# Patient Record
Sex: FEMALE | Race: WHITE | NOT HISPANIC OR LATINO | Employment: FULL TIME | URBAN - METROPOLITAN AREA
[De-identification: names, ages, dates, MRNs, and addresses within clinical notes are randomized per-mention and may not be internally consistent; named-entity substitution may affect disease eponyms.]

---

## 2017-08-03 ENCOUNTER — APPOINTMENT (OUTPATIENT)
Dept: WOMENS IMAGING | Facility: HOSPITAL | Age: 42
End: 2017-08-03

## 2017-08-03 PROCEDURE — 77067 SCR MAMMO BI INCL CAD: CPT | Performed by: RADIOLOGY

## 2018-08-10 ENCOUNTER — APPOINTMENT (OUTPATIENT)
Dept: WOMENS IMAGING | Facility: HOSPITAL | Age: 43
End: 2018-08-10

## 2018-08-10 PROCEDURE — 77067 SCR MAMMO BI INCL CAD: CPT | Performed by: RADIOLOGY

## 2019-08-27 ENCOUNTER — APPOINTMENT (OUTPATIENT)
Dept: WOMENS IMAGING | Facility: HOSPITAL | Age: 44
End: 2019-08-27

## 2019-08-27 PROCEDURE — 77063 BREAST TOMOSYNTHESIS BI: CPT | Performed by: RADIOLOGY

## 2019-08-27 PROCEDURE — 77067 SCR MAMMO BI INCL CAD: CPT | Performed by: RADIOLOGY

## 2020-04-24 ENCOUNTER — TELEMEDICINE (OUTPATIENT)
Dept: GASTROENTEROLOGY | Facility: CLINIC | Age: 45
End: 2020-04-24

## 2020-04-24 VITALS — WEIGHT: 135 LBS

## 2020-04-24 DIAGNOSIS — R14.0 BLOATING: ICD-10-CM

## 2020-04-24 DIAGNOSIS — K21.9 GASTROESOPHAGEAL REFLUX DISEASE, ESOPHAGITIS PRESENCE NOT SPECIFIED: ICD-10-CM

## 2020-04-24 DIAGNOSIS — K58.0 IRRITABLE BOWEL SYNDROME WITH DIARRHEA: Primary | ICD-10-CM

## 2020-04-24 DIAGNOSIS — K22.0 ACHALASIA: ICD-10-CM

## 2020-04-24 PROCEDURE — 99213 OFFICE O/P EST LOW 20 MIN: CPT | Performed by: NURSE PRACTITIONER

## 2020-04-24 RX ORDER — OMEPRAZOLE AND SODIUM BICARBONATE 40; 1100 MG/1; MG/1
1 CAPSULE ORAL
Qty: 90 CAPSULE | Refills: 3 | Status: SHIPPED | OUTPATIENT
Start: 2020-04-24 | End: 2020-07-24 | Stop reason: SDUPTHER

## 2020-04-24 RX ORDER — SUCRALFATE 1 G/1
1 TABLET ORAL 4 TIMES DAILY PRN
Qty: 360 TABLET | Refills: 3 | Status: SHIPPED | OUTPATIENT
Start: 2020-04-24 | End: 2021-04-24

## 2020-04-24 RX ORDER — SUCRALFATE 1 G/1
1 TABLET ORAL 4 TIMES DAILY
COMMUNITY
End: 2020-04-24 | Stop reason: SDUPTHER

## 2020-04-24 RX ORDER — OMEPRAZOLE/SODIUM BICARBONATE 40; 1680 MG/1; MG/1
POWDER, FOR SUSPENSION ORAL
COMMUNITY
End: 2020-04-24

## 2020-04-24 NOTE — PROGRESS NOTES
Chief Complaint   Patient presents with   • Bloated     follow up   • Heartburn   • Irritable Bowel Syndrome       HPI  44-year-old female presents today for telehealth visit for follow-up.  She was a patient in her previous practice and has a history of esophagitis, achalasia, GERD, abdominal bloating, and IBS.    She has a history of esophagitis, GERD, and achalasia and intermittently takes Zegerid 40 mg / 1100 mg when symptoms flare with good relief of symptoms.  Additionally, she will use sucralfate tablets up to 4 times daily as needed for epigastric discomfort.  She request refills for these medications due to a flare of symptoms, which she feels is secondary to stress.  She reports that her last EGD was greater than 5 years ago.  She has undergone esophageal manometry and underwent Botox injection into the LES by Dr. Fields for achalasia, which did work well to manage symptoms.  Records are not available, but we will request for our review.  Occasionally she will experience difficulty with swallowing meats and breads, but has altered her diet to compensate and at this time does not feel that any intervention is warranted.    She has a history of abdominal bloating and IBS, and symptoms are worse when she is stressed.  She is up-to-date on her female exams.  She reports her bowel habits are regular and denies having any diarrhea, constipation, melena, or hematochezia at the present time.  She has experienced a significant amount of abdominal bloating over the past several weeks and will alter her diet accordingly to help manage symptoms when needed.  However, dietary measures alone have not been effective.  Alcohol seems to worsen symptoms. She denies any abdominal pain. She has taken Xifaxan in the past with good results, but has also had some problems with insurancecoverage. She does not take a daily probiotic. She has never had a colonoscopy.  She denies any family history of colon cancer.  She reports a  history of breast cancer in her mother and a history of renal cancer in her father.    You have chosen to receive care through a telehealth visit.  Do you consent to use a video/audio connection for your medical care today? Yes      Review of Systems   Constitutional: Negative for fatigue and fever.   Respiratory: Negative for cough and shortness of breath.    Gastrointestinal: Positive for abdominal distention. Negative for abdominal pain, constipation and diarrhea.       Problem List:  There is no problem list on file for this patient.      Medical History:    Past Medical History:   Diagnosis Date   • GERD (gastroesophageal reflux disease)    • Irritable bowel syndrome         Social History:    Social History     Socioeconomic History   • Marital status: Unknown     Spouse name: Not on file   • Number of children: Not on file   • Years of education: Not on file   • Highest education level: Not on file   Tobacco Use   • Smoking status: Never Smoker   • Smokeless tobacco: Never Used   Substance and Sexual Activity   • Alcohol use: Yes     Alcohol/week: 3.0 standard drinks     Types: 3 Glasses of wine per week   • Drug use: Never   • Sexual activity: Defer       Family History:   Family History   Problem Relation Age of Onset   • Colon polyps Mother    • Breast cancer Mother    • Colon polyps Father    • Kidney cancer Father    • Liver cancer Neg Hx        Surgical History:   Past Surgical History:   Procedure Laterality Date   • UPPER GASTROINTESTINAL ENDOSCOPY           Current Outpatient Medications:   •  omeprazole-sodium bicarbonate (ZEGERID)  MG pack packet, Take  by mouth Every Morning Before Breakfast., Disp: , Rfl:   •  sucralfate (CARAFATE) 1 g tablet, Take 1 g by mouth 4 (Four) Times a Day., Disp: , Rfl:     Allergies:  Patient has no known allergies.    The following portions of the patient's history were reviewed and updated as appropriate: allergies, current medications, past family history,  past medical history, past social history, past surgical history and problem list.    Physical Exam    Assessment/ Plan  Maria Del Rosario was seen today for bloated, heartburn and irritable bowel syndrome.    Diagnoses and all orders for this visit:    Irritable bowel syndrome with diarrhea    Bloating    Gastroesophageal reflux disease, esophagitis presence not specified    Achalasia         Return in about 1 year (around 4/24/2021) for follow up or sooner if symptoms worsen or fail to improve.        Discussion:  We will provide the patient with prescriptions for Zegerid and sucralfate for the management of GERD.  She has entered tremendous amount of stress at this time, and symptoms have flared.    For abdominal bloating and IBS, we will place the patient on a course of Xifaxan.  We have also recommended use of a daily probiotic as well as the low FODMAP diet to help the patient identify specific foods it could be contributing to her symptoms.  Patient recommended to have screening colonoscopy at age 45.  Recommend annual office follow-up for reassessment of symptoms and medication refills, or sooner should symptoms worsen or fail to improve.  Patient verbalized understanding of above and is in agreement with plan of care.  All questions answered and support provided.    This visit was completed as a telemedicine video visit due to COVID-19 pandemic.

## 2020-04-24 NOTE — PATIENT INSTRUCTIONS
1.  For intermittent GERD, you may take Zegerid 1 tablet daily.  Refills have been sent to your pharmacy.    2.  Additionally for GERD and epigastric discomfort, you may take sucralfate 1 tablet up to 4 times daily as needed.  We recommend dissolving sucralfate and 1 tablespoon of water to make a slurry, then swallow.  Refills have been sent to your pharmacy.    3.  Recommend daily probiotic.    4.  For abdominal bloating and IBS, we will place you on a course of Xifaxan.  You will take 1 tablet 3 times daily x14 days.  Please visit [facility] to fill out a patient form that will enable you to receive your medication for a $0 copay.    5.  For colon cancer screening purposes, we recommend screening colonoscopy at age 45.    6.  Recommend annual office follow-up for reassessment and medication refills, or sooner should symptoms worsen or fail to improve.

## 2020-07-24 DIAGNOSIS — K21.9 GASTROESOPHAGEAL REFLUX DISEASE, ESOPHAGITIS PRESENCE NOT SPECIFIED: Primary | ICD-10-CM

## 2020-07-24 RX ORDER — OMEPRAZOLE AND SODIUM BICARBONATE 40; 1100 MG/1; MG/1
1 CAPSULE ORAL
Qty: 90 CAPSULE | Refills: 3 | Status: SHIPPED | OUTPATIENT
Start: 2020-07-24 | End: 2021-07-19

## 2021-04-22 ENCOUNTER — APPOINTMENT (OUTPATIENT)
Dept: WOMENS IMAGING | Facility: HOSPITAL | Age: 46
End: 2021-04-22

## 2021-04-22 PROCEDURE — 77063 BREAST TOMOSYNTHESIS BI: CPT | Performed by: RADIOLOGY

## 2021-04-22 PROCEDURE — 77067 SCR MAMMO BI INCL CAD: CPT | Performed by: RADIOLOGY

## 2022-05-24 ENCOUNTER — APPOINTMENT (OUTPATIENT)
Dept: WOMENS IMAGING | Facility: HOSPITAL | Age: 47
End: 2022-05-24

## 2022-05-24 PROCEDURE — 77067 SCR MAMMO BI INCL CAD: CPT | Performed by: RADIOLOGY

## 2022-05-24 PROCEDURE — 77063 BREAST TOMOSYNTHESIS BI: CPT | Performed by: RADIOLOGY

## 2023-08-14 ENCOUNTER — APPOINTMENT (OUTPATIENT)
Dept: WOMENS IMAGING | Facility: HOSPITAL | Age: 48
End: 2023-08-14
Payer: COMMERCIAL

## 2023-08-14 PROCEDURE — G0279 TOMOSYNTHESIS, MAMMO: HCPCS | Performed by: RADIOLOGY

## 2023-08-14 PROCEDURE — 77065 DX MAMMO INCL CAD UNI: CPT | Performed by: RADIOLOGY

## 2023-08-14 PROCEDURE — 77061 BREAST TOMOSYNTHESIS UNI: CPT | Performed by: RADIOLOGY

## 2024-02-19 ENCOUNTER — APPOINTMENT (OUTPATIENT)
Dept: WOMENS IMAGING | Facility: HOSPITAL | Age: 49
End: 2024-02-19
Payer: COMMERCIAL

## 2024-02-19 PROCEDURE — G0279 TOMOSYNTHESIS, MAMMO: HCPCS | Performed by: RADIOLOGY

## 2024-02-19 PROCEDURE — 77065 DX MAMMO INCL CAD UNI: CPT | Performed by: RADIOLOGY

## 2024-02-19 PROCEDURE — 77061 BREAST TOMOSYNTHESIS UNI: CPT | Performed by: RADIOLOGY

## 2024-05-21 DIAGNOSIS — R10.11 RIGHT UPPER QUADRANT ABDOMINAL PAIN: ICD-10-CM

## 2024-05-21 DIAGNOSIS — R11.2 NAUSEA AND VOMITING, UNSPECIFIED VOMITING TYPE: Primary | ICD-10-CM

## 2024-06-06 VITALS — WEIGHT: 130 LBS

## 2024-06-07 ENCOUNTER — HOSPITAL ENCOUNTER (OUTPATIENT)
Dept: NUCLEAR MEDICINE | Facility: HOSPITAL | Age: 49
Discharge: HOME | End: 2024-06-07
Payer: COMMERCIAL

## 2024-06-07 DIAGNOSIS — R11.2 NAUSEA AND VOMITING, UNSPECIFIED VOMITING TYPE: ICD-10-CM

## 2024-06-07 DIAGNOSIS — R10.11 RIGHT UPPER QUADRANT ABDOMINAL PAIN: ICD-10-CM

## 2024-06-07 PROCEDURE — 78227 HEPATOBIL SYST IMAGE W/DRUG: CPT

## 2024-06-07 PROCEDURE — 25010000002 SINCALIDE PER 5 MCG: Performed by: NURSE PRACTITIONER

## 2024-06-07 PROCEDURE — 0 TECHNETIUM TC 99M MEBROFENIN KIT: Performed by: NURSE PRACTITIONER

## 2024-06-07 PROCEDURE — A9537 TC99M MEBROFENIN: HCPCS | Performed by: NURSE PRACTITIONER

## 2024-06-07 RX ORDER — KIT FOR THE PREPARATION OF TECHNETIUM TC 99M MEBROFENIN 45 MG/10ML
1 INJECTION, POWDER, LYOPHILIZED, FOR SOLUTION INTRAVENOUS
Status: COMPLETED | OUTPATIENT
Start: 2024-06-07 | End: 2024-06-07

## 2024-06-07 RX ADMIN — MEBROFENIN 1 DOSE: 45 INJECTION, POWDER, LYOPHILIZED, FOR SOLUTION INTRAVENOUS at 08:30

## 2024-06-07 RX ADMIN — SINCALIDE 1.2 MCG: 5 INJECTION, POWDER, LYOPHILIZED, FOR SOLUTION INTRAVENOUS at 09:49

## 2024-08-07 ENCOUNTER — TRANSCRIBE ORDERS (OUTPATIENT)
Dept: ADMINISTRATIVE | Facility: HOSPITAL | Age: 49
End: 2024-08-07
Payer: COMMERCIAL

## 2024-08-07 DIAGNOSIS — Z80.3 FAMILY HISTORY OF BREAST CANCER: Primary | ICD-10-CM

## 2024-08-09 ENCOUNTER — OFFICE VISIT (OUTPATIENT)
Dept: GASTROENTEROLOGY | Facility: CLINIC | Age: 49
End: 2024-08-09
Payer: COMMERCIAL

## 2024-08-09 ENCOUNTER — PREP FOR SURGERY (OUTPATIENT)
Dept: SURGERY | Facility: SURGERY CENTER | Age: 49
End: 2024-08-09
Payer: COMMERCIAL

## 2024-08-09 VITALS
HEART RATE: 61 BPM | HEIGHT: 68 IN | BODY MASS INDEX: 19.85 KG/M2 | DIASTOLIC BLOOD PRESSURE: 58 MMHG | OXYGEN SATURATION: 99 % | TEMPERATURE: 98.2 F | WEIGHT: 131 LBS | SYSTOLIC BLOOD PRESSURE: 100 MMHG

## 2024-08-09 DIAGNOSIS — R10.10 UPPER ABDOMINAL PAIN: ICD-10-CM

## 2024-08-09 DIAGNOSIS — R11.2 NAUSEA AND VOMITING, UNSPECIFIED VOMITING TYPE: Primary | ICD-10-CM

## 2024-08-09 DIAGNOSIS — K22.0 ACHALASIA: ICD-10-CM

## 2024-08-09 DIAGNOSIS — K63.8219 SMALL INTESTINAL BACTERIAL OVERGROWTH (SIBO): ICD-10-CM

## 2024-08-09 DIAGNOSIS — R10.10 UPPER ABDOMINAL PAIN: Chronic | ICD-10-CM

## 2024-08-09 DIAGNOSIS — K21.9 GASTROESOPHAGEAL REFLUX DISEASE, UNSPECIFIED WHETHER ESOPHAGITIS PRESENT: ICD-10-CM

## 2024-08-09 DIAGNOSIS — K22.0 ACHALASIA: Chronic | ICD-10-CM

## 2024-08-09 DIAGNOSIS — Z12.11 COLON CANCER SCREENING: ICD-10-CM

## 2024-08-09 DIAGNOSIS — K21.9 GASTROESOPHAGEAL REFLUX DISEASE, UNSPECIFIED WHETHER ESOPHAGITIS PRESENT: Chronic | ICD-10-CM

## 2024-08-09 DIAGNOSIS — R11.2 NAUSEA AND VOMITING, UNSPECIFIED VOMITING TYPE: Primary | Chronic | ICD-10-CM

## 2024-08-09 PROCEDURE — 99204 OFFICE O/P NEW MOD 45 MIN: CPT | Performed by: NURSE PRACTITIONER

## 2024-08-09 RX ORDER — SODIUM CHLORIDE, SODIUM LACTATE, POTASSIUM CHLORIDE, CALCIUM CHLORIDE 600; 310; 30; 20 MG/100ML; MG/100ML; MG/100ML; MG/100ML
30 INJECTION, SOLUTION INTRAVENOUS CONTINUOUS PRN
OUTPATIENT
Start: 2024-08-09

## 2024-08-09 RX ORDER — SODIUM CHLORIDE 0.9 % (FLUSH) 0.9 %
10 SYRINGE (ML) INJECTION AS NEEDED
OUTPATIENT
Start: 2024-08-09

## 2024-08-09 RX ORDER — PROGESTERONE 200 MG/1
200 CAPSULE ORAL DAILY
COMMUNITY

## 2024-08-09 RX ORDER — SODIUM CHLORIDE 0.9 % (FLUSH) 0.9 %
3 SYRINGE (ML) INJECTION EVERY 12 HOURS SCHEDULED
OUTPATIENT
Start: 2024-08-09

## 2024-08-09 NOTE — PATIENT INSTRUCTIONS
1.  For further evaluate of chronic nausea, vomiting, history of mild achalasia, and upper abdominal pain we will schedule EGD.    2.  For colon cancer screening we will schedule colonoscopy.    3.  We will also schedule a UGI with SBFT.     4.  You may use Zofran as prescribed as needed for nausea/vomiting.     5.  You may use OTC Pantoprazole 20 mg once daily as needed for upper abdominal pain if you feel it is GERD related.    6.  We will send in a prescription for Levsin. You may place 1-2 tabs under the tongue every 6 hours as needed for upper abdominal pain.    7.  Recommend next office follow-up visit 2-4 weeks after scopes.

## 2024-08-09 NOTE — PROGRESS NOTES
Chief Complaint   Patient presents with    Nausea    Vomiting    Abdominal Pain    Heartburn         History of Present Illness  48-year-old male presents the office today as a new patient (previous patient) with reports of abdominal bloating, nausea, and vomiting.  She still has her gallbladder.  HIDA scan on 6/7/2024 revealed normal gallbladder EF of 79%.  Last EGD performed on 10/19/2019 with Dr. Fields included Botox injection into the LES for achalasia.  Mild achalasia was noted on video swallowing study on 10/10/2012.    She reports that the Botox injection into the LES in 2019 provided relief. She could swallow much better at that time. If she does not chew her food thoroughly she will have dysphagia. She has pill dysphagia. Breads can be difficult. She underwent esophageal manometry testing September 2012 which revealed contraction wave formation.     She reports random RUQ pain about 2-3 x per week.  She can have pain with or without eating. Pain can move over to the LUQ and she describes this as a squeezing type of pain. She had eggs this morning for breakfast and then had pressure/squeezing in her upper abdomen. This occurs about 1-1.5 hours after eating. She always has a dull ache in her RUQ. She was given some Reglan in the [place] which did seem to help her symptoms somewhat. She has reported improvement with PPI use in the past, but not consistently.     She had intermittent n/v for the past 2 years that would occur about once every 2-3 months. It almost felt like a stomach virus. She would not have diarrhea. Symptoms could occur in the middle of the night. She reports having a RUQ u/s done in the [place] on 4/30/2024 which was normal per her report. She had an abnormal EKG and underwent an echocardiogram, but states that images were difficult to see due to breast implants and heart MRI was ordered.  All of this recent testing was performed in the [place], as she was living abroad at the  "time.  She was recommended to schedule an appt with cardiology for follow up.     She has never had a colonoscopy. Bms are regular. She denies any melena or rectal bleeding. Her weight is stable.     She has cut out dairy and states her bloating has significantly improved. She has had good results with Xifaxan in the past.       Result Review :       ENDOSCOPY, INT (10/19/2019)   NM HIDA SCAN WITH PHARMACOLOGICAL INTERVENTION (06/07/2024 10:36)   SCANNED - IMAGING (10/10/2012)     Vital Signs:   /58   Pulse 61   Temp 98.2 °F (36.8 °C)   Ht 172.7 cm (68\")   Wt 59.4 kg (131 lb)   SpO2 99%   BMI 19.92 kg/m²     Body mass index is 19.92 kg/m².     Physical Exam  Vitals reviewed.   Constitutional:       Appearance: Normal appearance.   HENT:      Head: Normocephalic.      Nose: Nose normal.      Mouth/Throat:      Mouth: Mucous membranes are moist.   Eyes:      General: No scleral icterus.     Extraocular Movements: Extraocular movements intact.   Cardiovascular:      Rate and Rhythm: Normal rate and regular rhythm.      Pulses: Normal pulses.      Heart sounds: Normal heart sounds.   Pulmonary:      Effort: Pulmonary effort is normal. No respiratory distress.      Breath sounds: Normal breath sounds.   Abdominal:      General: Abdomen is flat. Bowel sounds are normal. There is no distension.      Palpations: Abdomen is soft. There is no mass.      Tenderness: There is abdominal tenderness. There is no guarding.      Comments: Discomfort on palpation of epigastric area   Musculoskeletal:         General: Normal range of motion.      Cervical back: Normal range of motion and neck supple.   Skin:     General: Skin is warm and dry.   Neurological:      General: No focal deficit present.      Mental Status: She is alert and oriented to person, place, and time.   Psychiatric:         Mood and Affect: Mood normal.         Behavior: Behavior normal.         Thought Content: Thought content normal.         Judgment: " Judgment normal.       Assessment and Plan    Diagnoses and all orders for this visit:    1. Nausea and vomiting, unspecified vomiting type (Primary)  -     FL Upper GI Double Contrast & SBFT; Future    2. Upper abdominal pain  -     FL Upper GI Double Contrast & SBFT; Future    3. Achalasia  -     FL Upper GI Double Contrast & SBFT; Future    4. Gastroesophageal reflux disease, unspecified whether esophagitis present  -     FL Upper GI Double Contrast & SBFT; Future    5. Small intestinal bacterial overgrowth (SIBO)  Comments:  History of  Orders:  -     FL Upper GI Double Contrast & SBFT; Future    6. Colon cancer screening    Other orders  -     hyoscyamine (LEVSIN) 0.125 MG SL tablet; May place 1-2 tabs under the tongue every 6 hrs as needed for esophageal spasms  Dispense: 60 tablet; Refill: 2           Patient Instructions   1.  For further evaluate of chronic nausea, vomiting, history of mild achalasia, and upper abdominal pain we will schedule EGD.    2.  For colon cancer screening we will schedule colonoscopy.    3.  We will also schedule a UGI with SBFT.     4.  You may use Zofran as prescribed as needed for nausea/vomiting.     5.  You may use OTC Pantoprazole 20 mg once daily as needed for upper abdominal pain if you feel it is GERD related.    6.  We will send in a prescription for Levsin. You may place 1-2 tabs under the tongue every 6 hours as needed for upper abdominal pain.    7.  Recommend next office follow-up visit 2-4 weeks after scopes.       Discussion:    We will proceed with EGD and colonoscopy for further evaluation of intermittent nausea and vomiting, history of mild achalasia, upper abdominal pain.  For colon cancer screening we will schedule colonoscopy.    For further evaluation of upper abdominal pain and to ensure normal transit is occurring in her small intestine, we will proceed with upper GI with small bowel follow-through.  We will contact patient with results once available.       Prescription for Levsin has been sent to the patient's pharmacy to help with management of upper abdominal pain and to help relax the esophagus as she does have a history of achalasia.    Patient may continue to use Zofran as needed for intermittent nausea and vomiting.  It does not appear that the patient is suffering from gastroparesis based upon symptoms.    Additional recommendations pending outcome of imaging, response to Levsin, and findings on EGD and colonoscopy.  Some of the patient's workup was performed in the [place].  We may need to consider repeating  abdominal imaging based upon endoscopic findings to rule out gallbladder etiology.  We will plan for office follow-up 2 to 4 weeks after procedures to discuss results and reassess symptoms.  Patient verbalized understanding of above plan of care and is in agreement.  All questions answered and support provided.    EMR Dragon/Transcription Disclaimer:  This document has been Dictated utilizing Dragon dictation.

## 2024-10-25 ENCOUNTER — APPOINTMENT (OUTPATIENT)
Dept: WOMENS IMAGING | Facility: HOSPITAL | Age: 49
End: 2024-10-25
Payer: COMMERCIAL

## 2024-10-25 PROCEDURE — 77066 DX MAMMO INCL CAD BI: CPT | Performed by: RADIOLOGY

## 2024-10-25 PROCEDURE — 77062 BREAST TOMOSYNTHESIS BI: CPT | Performed by: RADIOLOGY

## 2024-10-25 PROCEDURE — G0279 TOMOSYNTHESIS, MAMMO: HCPCS | Performed by: RADIOLOGY

## 2024-10-30 ENCOUNTER — APPOINTMENT (OUTPATIENT)
Dept: WOMENS IMAGING | Facility: HOSPITAL | Age: 49
End: 2024-10-30
Payer: COMMERCIAL

## 2024-10-30 PROCEDURE — 76642 ULTRASOUND BREAST LIMITED: CPT | Performed by: RADIOLOGY

## 2024-11-07 ENCOUNTER — OFFICE VISIT (OUTPATIENT)
Dept: INTERNAL MEDICINE | Facility: CLINIC | Age: 49
End: 2024-11-07
Payer: COMMERCIAL

## 2024-11-07 VITALS
HEART RATE: 68 BPM | BODY MASS INDEX: 20.28 KG/M2 | OXYGEN SATURATION: 99 % | SYSTOLIC BLOOD PRESSURE: 114 MMHG | WEIGHT: 133.8 LBS | HEIGHT: 68 IN | DIASTOLIC BLOOD PRESSURE: 78 MMHG

## 2024-11-07 DIAGNOSIS — R94.31 ABNORMAL EKG: ICD-10-CM

## 2024-11-07 DIAGNOSIS — Z00.00 ANNUAL PHYSICAL EXAM: Primary | ICD-10-CM

## 2024-11-07 PROCEDURE — 93000 ELECTROCARDIOGRAM COMPLETE: CPT | Performed by: STUDENT IN AN ORGANIZED HEALTH CARE EDUCATION/TRAINING PROGRAM

## 2024-11-07 PROCEDURE — 99386 PREV VISIT NEW AGE 40-64: CPT | Performed by: STUDENT IN AN ORGANIZED HEALTH CARE EDUCATION/TRAINING PROGRAM

## 2024-11-07 NOTE — PROGRESS NOTES
David Feliciano M.D.  Internal Medicine  [facility]  [address], [address]  [place] [address]  [phone]      Chief Complaint  Establish Care and referral needed     SUBJECTIVE    History of Present Illness    Maria Del Rosario Duff is a 48 y.o. female who presents to the office today as a new patient to establish care.     She is following with gastroenterology for chronic nausea and vomiting. History of achalasia.  EGD and colonoscopy pending.     Used to live in the [place]. Went to ED ith nausea and righ abdominal pain. Had negative US. Had abnormal EKG. Could not due echo due to implants. Has palpitations, shortness of breath, dizziness. Advised to get roe MRI. She was advised to follow-up with cardiology.    One day of chest tightness and electrical shocks. Also felt pulsating in abdomen.     Exercses 4-5 days/week regularly but not much since since move. No chest discomfort exercising.     Due for screening colonoscopy.     Follows with GYN.     Moved back from [place] and [facility]     Review of Systems    No Known Allergies     Outpatient Medications Marked as Taking for the 11/7/24 encounter (Office Visit) with David Feliciano MD   Medication Sig Dispense Refill    ESTERIFIED ESTROGENS PO Take 1 mg by mouth.      Misc Natural Products (NF FORMULAS TESTOSTERONE PO) Take 5 mg by mouth.      Progesterone (PROMETRIUM) 200 MG capsule Take 1 capsule by mouth Daily.          Past Medical History:   Diagnosis Date    GERD (gastroesophageal reflux disease)     History of medical problems 2018    Thyroid Nodules    Irritable bowel syndrome      Past Surgical History:   Procedure Laterality Date    APPENDECTOMY      BREAST SURGERY  2010    Breast Augmentation    TONSILLECTOMY  1996    UPPER GASTROINTESTINAL ENDOSCOPY       Family History   Problem Relation Age of Onset    Ulcerative colitis Mother     Colon polyps Mother     Breast cancer Mother     Cancer Mother         Breast cancer     "Diabetes Mother     Stroke Mother     Colon polyps Father     Kidney cancer Father     Cancer Father         Kidney cancer    Aortic aneurysm Father     Diabetes Daughter         IDDM    Cancer Maternal Grandfather         Stomach cancer    Liver cancer Neg Hx     Colon cancer Neg Hx     Crohn's disease Neg Hx     Irritable bowel syndrome Neg Hx     reports that she has never smoked. She has never been exposed to tobacco smoke. She has never used smokeless tobacco. She reports current alcohol use of about 3.0 standard drinks of alcohol per week. She reports that she does not use drugs.    OBJECTIVE    Vital Signs:   /78   Pulse 68   Ht 172.7 cm (67.99\")   Wt 60.7 kg (133 lb 12.8 oz)   SpO2 99%   BMI 20.35 kg/m²     Physical Exam  Constitutional:       Appearance: Normal appearance. She is normal weight.   HENT:      Right Ear: Tympanic membrane normal.      Left Ear: Tympanic membrane normal.   Cardiovascular:      Rate and Rhythm: Normal rate and regular rhythm.      Heart sounds: Normal heart sounds. No murmur heard.  Pulmonary:      Effort: Pulmonary effort is normal.      Breath sounds: Normal breath sounds.   Abdominal:      General: Abdomen is flat. There is no distension.      Palpations: Abdomen is soft.      Tenderness: There is no abdominal tenderness.   Musculoskeletal:      Right lower leg: No edema.      Left lower leg: No edema.   Skin:     General: Skin is warm and dry.   Neurological:      Mental Status: She is alert.   Psychiatric:         Mood and Affect: Mood normal.         Behavior: Behavior normal.         Thought Content: Thought content normal.            The following data was reviewed by: David Feliciano MD on 11/07/2024:      Recent gastroenterology note                  ASSESSMENT & PLAN     Diagnoses and all orders for this visit:    1. Annual physical exam (Primary)  -     Comprehensive Metabolic Panel  -     CBC & Differential  -     Lipid Panel  -     TSH Rfx On Abnormal To " Free T4  -     Hemoglobin A1c    #Annual Preventative Health Examination   -Age and sex appropriate physical exam performed and documented. Updated past medical, family, social and surgical histories as well as allergies and care team list. Addressed care gaps listed in the medical record.  --Encouraged annual dental and vision exams as part of their overall health.  -Encouraged minimum of 30 minutes or more of exercise at a brisk walk or higher 5 days per week combined with a well-balanced diet.  -Lipid screening:  Will screen for hyperlipidemia today and calculate ASCVD risk if appropriate.    -Aspirin for primary or secondary prevention: Not applicable, patient is less than age 50.  -Depression screening: PHQ2 performed and the patient's screen was negative.  -Diabetes screening:  Screening today  --Hypertension screening: Patient screened negative for HTN today.  --Colon cancer screening: Colonoscopy pending  -Lung cancer screening: Patient has never smoked.  -Cervical cancer screening: Per GYN  -Breast cancer screening: Per GYN    2. Abnormal EKG  -     Ambulatory Referral to Cardiology  -     ECG 12 Lead    EKG today with nonspecific changes.  Referring to cardiology.    Health Maintenance Due   Topic Date Due    MAMMOGRAM  Never done    COLORECTAL CANCER SCREENING  Never done    HEPATITIS C SCREENING  Never done    ANNUAL PHYSICAL  Never done    PAP SMEAR  Never done    INFLUENZA VACCINE  08/01/2024    COVID-19 Vaccine (4 - 2024-25 season) 09/01/2024        Follow Up  No follow-ups on file.    Patient/family had no further questions at this time and verbalized understanding of the plan discussed today.

## 2024-11-08 ENCOUNTER — PATIENT ROUNDING (BHMG ONLY) (OUTPATIENT)
Dept: INTERNAL MEDICINE | Facility: CLINIC | Age: 49
End: 2024-11-08
Payer: COMMERCIAL

## 2024-11-08 NOTE — PROGRESS NOTES
November 8, 2024  Rounded with & welcomed patient during rooming, check in/out.  Patient will follow up at next scheduled office visit.

## 2024-11-15 LAB
ALBUMIN SERPL-MCNC: 4.3 G/DL (ref 3.5–5.2)
ALBUMIN/GLOB SERPL: 2 G/DL
ALP SERPL-CCNC: 86 U/L (ref 39–117)
ALT SERPL-CCNC: 23 U/L (ref 1–33)
AST SERPL-CCNC: 24 U/L (ref 1–32)
BASOPHILS # BLD AUTO: 0.03 10*3/MM3 (ref 0–0.2)
BASOPHILS NFR BLD AUTO: 0.5 % (ref 0–1.5)
BILIRUB SERPL-MCNC: 0.4 MG/DL (ref 0–1.2)
BUN SERPL-MCNC: 21 MG/DL (ref 6–20)
BUN/CREAT SERPL: 25.6 (ref 7–25)
CALCIUM SERPL-MCNC: 9.6 MG/DL (ref 8.6–10.5)
CHLORIDE SERPL-SCNC: 105 MMOL/L (ref 98–107)
CHOLEST SERPL-MCNC: 208 MG/DL (ref 0–200)
CO2 SERPL-SCNC: 26.8 MMOL/L (ref 22–29)
CREAT SERPL-MCNC: 0.82 MG/DL (ref 0.57–1)
EGFRCR SERPLBLD CKD-EPI 2021: 88.4 ML/MIN/1.73
EOSINOPHIL # BLD AUTO: 0.09 10*3/MM3 (ref 0–0.4)
EOSINOPHIL NFR BLD AUTO: 1.4 % (ref 0.3–6.2)
ERYTHROCYTE [DISTWIDTH] IN BLOOD BY AUTOMATED COUNT: 12.2 % (ref 12.3–15.4)
GLOBULIN SER CALC-MCNC: 2.2 GM/DL
GLUCOSE SERPL-MCNC: 88 MG/DL (ref 65–99)
HBA1C MFR BLD: 5.3 % (ref 4.8–5.6)
HCT VFR BLD AUTO: 39.5 % (ref 34–46.6)
HDLC SERPL-MCNC: 81 MG/DL (ref 40–60)
HGB BLD-MCNC: 13.3 G/DL (ref 12–15.9)
IMM GRANULOCYTES # BLD AUTO: 0.01 10*3/MM3 (ref 0–0.05)
IMM GRANULOCYTES NFR BLD AUTO: 0.2 % (ref 0–0.5)
LDLC SERPL CALC-MCNC: 118 MG/DL (ref 0–100)
LYMPHOCYTES # BLD AUTO: 1.69 10*3/MM3 (ref 0.7–3.1)
LYMPHOCYTES NFR BLD AUTO: 25.5 % (ref 19.6–45.3)
MCH RBC QN AUTO: 29.6 PG (ref 26.6–33)
MCHC RBC AUTO-ENTMCNC: 33.7 G/DL (ref 31.5–35.7)
MCV RBC AUTO: 88 FL (ref 79–97)
MONOCYTES # BLD AUTO: 0.48 10*3/MM3 (ref 0.1–0.9)
MONOCYTES NFR BLD AUTO: 7.2 % (ref 5–12)
NEUTROPHILS # BLD AUTO: 4.34 10*3/MM3 (ref 1.7–7)
NEUTROPHILS NFR BLD AUTO: 65.2 % (ref 42.7–76)
NRBC BLD AUTO-RTO: 0 /100 WBC (ref 0–0.2)
PLATELET # BLD AUTO: 276 10*3/MM3 (ref 140–450)
POTASSIUM SERPL-SCNC: 4.4 MMOL/L (ref 3.5–5.2)
PROT SERPL-MCNC: 6.5 G/DL (ref 6–8.5)
RBC # BLD AUTO: 4.49 10*6/MM3 (ref 3.77–5.28)
SODIUM SERPL-SCNC: 140 MMOL/L (ref 136–145)
TRIGL SERPL-MCNC: 48 MG/DL (ref 0–150)
TSH SERPL DL<=0.005 MIU/L-ACNC: 2.32 UIU/ML (ref 0.27–4.2)
VLDLC SERPL CALC-MCNC: 9 MG/DL (ref 5–40)
WBC # BLD AUTO: 6.64 10*3/MM3 (ref 3.4–10.8)

## 2024-11-15 NOTE — PROGRESS NOTES
RM:________    Referral Provider: David Feliciano MD Bowe, Casey, MD    NEW PATIENT/ CONSULT  PREVIOUS CARDIOLOGIST: ______________________________    CARDIAC TESTING: __________________________________________________    : 1975   AGE: 48 y.o.    2024  REASON FOR VISIT/  CC:      WT: ____________ BP: __________L __________R/ HR_______________    ALLERGIES:  Patient has no known allergies.  SMOKING HISTORY  Social History     Tobacco Use    Smoking status: Never     Passive exposure: Never    Smokeless tobacco: Never   Vaping Use    Vaping status: Never Used   Substance Use Topics    Alcohol use: Yes     Alcohol/week: 3.0 standard drinks of alcohol     Types: 3 Glasses of wine per week    Drug use: Never          H/O: MI_____   STROKE________   GOUT_____   ANEMIA______     CAROTID________ HIV____ CAD_______ HYPERCHOL _____    H/O: CHF _____   RF____ DM___ HTN_______PVD____THYROID DISEASE_______    PMH: GI ____   HEPATITIS ___ KIDNEY DISEASE ___ LUNG DISEASE _______     SLEEP APNEA ____ BLOOD CLOTS ____ DVT ____ VEIN STRIPPING ___________      STOP BANG _________ (CARDIO ONCOLOGY ONLY)    CANCER _________________________________ CHEMO/ RADIATION__________

## 2024-11-21 NOTE — PROGRESS NOTES
Date of Office Visit: 24  Encounter Provider: Franklyn Villalba MD  Place of Service: [facility] CARDIOLOGY  Patient Name: Maria Del Rosario Duff  :1975    Chief Complaint   Patient presents with    Chest Pain   :     HPI:     Ms. Duff is 49 y.o. and presents today in consultation for ER follow up at the request of Dr Feliciano.     She was previously living in [place] and in the [place] but this year, she and her  moved back.  While living in the [place] she had episodes of recurrent vomiting.  She then developed right upper abdominal pain and was seen in the emergency room.  She brought some records and they are in [place] but it appears that she had 2 high-sensitivity troponins checked and both were normal.  She says they attempted to do an echocardiogram but that the image quality was not great due to the presence of breast implants, and an outpatient cardiac MRI was recommended.  She reported that her pain would improve when she would lie on her left side, and they gave her a tentative diagnosis of pericarditis.  They did an ultrasound of her gallbladder which was reportedly normal.    Since coming home, she has not had pain but she does feel like she gets winded with exertion.  She feels palpitations intermittently where her heart feels like it skips or flutters for a short while.  She has occasional periods of dizziness where she feels like things are spinning.  She sometimes has positional lightheadedness.  She has not passed out, thankfully.      Past Medical History:   Diagnosis Date    GERD (gastroesophageal reflux disease)     Heart murmur     Irritable bowel syndrome     Thyroid nodule        Past Surgical History:   Procedure Laterality Date    APPENDECTOMY      BREAST SURGERY  2010    Breast Augmentation    TONSILLECTOMY      UPPER GASTROINTESTINAL ENDOSCOPY         Social History     Socioeconomic History    Marital status:    Tobacco Use     "Smoking status: Never     Passive exposure: Never    Smokeless tobacco: Never   Vaping Use    Vaping status: Never Used   Substance and Sexual Activity    Alcohol use: Yes     Alcohol/week: 3.0 standard drinks of alcohol     Types: 3 Glasses of wine per week    Drug use: Never    Sexual activity: Yes     Partners: Male     Birth control/protection: Post-menopausal, Vasectomy       Family History   Problem Relation Age of Onset    Ulcerative colitis Mother     Colon polyps Mother     Breast cancer Mother     Cancer Mother         Breast cancer    Diabetes Mother     Stroke Mother     Hyperlipidemia Mother     Colon polyps Father     Kidney cancer Father     Cancer Father         Kidney cancer    Aortic aneurysm Father     Hyperlipidemia Father     Hypertension Father     Diabetes Daughter         IDDM    Cancer Maternal Grandfather         Stomach cancer    Heart disease Paternal Grandfather         Angina    Liver cancer Neg Hx     Colon cancer Neg Hx     Crohn's disease Neg Hx     Irritable bowel syndrome Neg Hx        Review of Systems   Cardiovascular:  Positive for dyspnea on exertion and palpitations.   Neurological:  Positive for dizziness and light-headedness.       No Known Allergies      Current Outpatient Medications:     B Complex Vitamins (vitamin b complex) capsule capsule, Take  by mouth Daily., Disp: , Rfl:     Cyanocobalamin (VITAMIN B-12 PO), Take  by mouth Daily., Disp: , Rfl:     ESTERIFIED ESTROGENS PO, Take 1 mg by mouth., Disp: , Rfl:     Misc Natural Products (NF FORMULAS TESTOSTERONE PO), Take 5 mg by mouth., Disp: , Rfl:     Omega-3 Fatty Acids (OMEGA 3 PO), Take  by mouth., Disp: , Rfl:     Progesterone (PROMETRIUM) 200 MG capsule, Take 1 capsule by mouth Daily., Disp: , Rfl:       Objective:     Vitals:    11/22/24 0916   BP: 122/60   BP Location: Right arm   Patient Position: Sitting   Cuff Size: Adult   Pulse: 54   SpO2: 99%   Height: 172.7 cm (67.99\")     Body mass index is 20.35 " kg/m².    Vitals reviewed.   Constitutional:       Appearance: Well-developed and not in distress.   Eyes:      Conjunctiva/sclera: Conjunctivae normal.   HENT:      Head: Normocephalic.      Nose: Nose normal.   Neck:      Thyroid: Thyroid normal.      Vascular: No JVD. JVD normal.      Lymphadenopathy: No cervical adenopathy.   Pulmonary:      Effort: Pulmonary effort is normal.      Breath sounds: Normal breath sounds.   Cardiovascular:      Normal rate. Regular rhythm.      Murmurs: There is no murmur.      Comments: Very subtle respirophasic 1/6 JG  Pulses:     Intact distal pulses.   Edema:     Peripheral edema absent.   Abdominal:      Palpations: Abdomen is soft.      Tenderness: There is no abdominal tenderness.   Musculoskeletal: Normal range of motion.      Cervical back: Normal range of motion. Skin:     General: Skin is warm and dry.   Neurological:      General: No focal deficit present.      Mental Status: Alert and oriented to person, place, and time.      Cranial Nerves: No cranial nerve deficit.   Psychiatric:         Behavior: Behavior normal.         Thought Content: Thought content normal.         Judgment: Judgment normal.           ECG 12 Lead    Date/Time: 11/22/2024 3:01 PM  Performed by: Franklyn Villalba MD    Authorized by: Franklyn Villalba MD  Comparison: compared with previous ECG   Similar to previous ECG  Rhythm: sinus rhythm  Conduction: conduction normal  ST Segments: ST segments normal  T Waves: T waves normal  QRS axis: normal  Other: no other findings    Clinical impression: normal ECG            Assessment:       Diagnosis Plan   1. Chest discomfort  Adult Transthoracic Echo Complete W/ Cont if Necessary Per Protocol    Treadmill Stress Test    Holter Monitor - 24 Hour      2. Palpitations  Adult Transthoracic Echo Complete W/ Cont if Necessary Per Protocol    Treadmill Stress Test    Holter Monitor - 24 Hour      3. Shortness of breath  Adult Transthoracic Echo Complete W/ Cont if  Necessary Per Protocol    Treadmill Stress Test    Holter Monitor - 24 Hour      4. Dizziness  Adult Transthoracic Echo Complete W/ Cont if Necessary Per Protocol    Treadmill Stress Test    Holter Monitor - 24 Hour      5. Lightheadedness  Adult Transthoracic Echo Complete W/ Cont if Necessary Per Protocol    Treadmill Stress Test    Holter Monitor - 24 Hour      6. Heart murmur  Adult Transthoracic Echo Complete W/ Cont if Necessary Per Protocol    Treadmill Stress Test    Holter Monitor - 24 Hour             Plan:       Mrs Duff is an extremely healthy 48yo woman who had an episode of severe and recurrent vomiting associated with RUQ pain while living in the [place] earlier this year.  They gave her diagnosis of pericarditis based on the fact that she felt better when she lied on her left side.  However, the clinical scenario does not really correlate with pericarditis.  If she were to have recurrent symptoms, I would recommend a HIDA scan as she has already had a negative gallbladder ultrasound.    She has noticed intermittent palpitations, vertiginous dizziness, positional lightheadedness, and exercise intolerance/dyspnea.  She has a very subtle heart murmur which is consistent with a benign flow murmur.  An echocardiogram was attempted in the [place] but they could not obtain good images.  I would like to try that again here before going to a cardiac MRI.  I have recommended a 24-hour heart monitor and a treadmill stress test, as well.    She was told that her EKG was abnormal but when I look at it, it really appears unremarkable.    Sincerely,       Franklyn Villalba MD

## 2024-11-22 ENCOUNTER — OFFICE VISIT (OUTPATIENT)
Dept: CARDIOLOGY | Facility: CLINIC | Age: 49
End: 2024-11-22
Payer: COMMERCIAL

## 2024-11-22 VITALS
HEIGHT: 68 IN | BODY MASS INDEX: 20.35 KG/M2 | SYSTOLIC BLOOD PRESSURE: 122 MMHG | OXYGEN SATURATION: 99 % | DIASTOLIC BLOOD PRESSURE: 60 MMHG | HEART RATE: 54 BPM

## 2024-11-22 DIAGNOSIS — R07.89 CHEST DISCOMFORT: Primary | ICD-10-CM

## 2024-11-22 DIAGNOSIS — R01.1 HEART MURMUR: ICD-10-CM

## 2024-11-22 DIAGNOSIS — R42 LIGHTHEADEDNESS: ICD-10-CM

## 2024-11-22 DIAGNOSIS — R00.2 PALPITATIONS: ICD-10-CM

## 2024-11-22 DIAGNOSIS — R42 DIZZINESS: ICD-10-CM

## 2024-11-22 DIAGNOSIS — R06.02 SHORTNESS OF BREATH: ICD-10-CM

## 2024-11-22 RX ORDER — VITAMIN B COMPLEX
CAPSULE ORAL DAILY
COMMUNITY

## 2024-11-22 NOTE — LETTER
2024     David Feliciano MD  [address]  [facility] [address]    Patient: Maria Del Rosario Duff   YOB: 1975   Date of Visit: 2024       Dear David Feliciano MD    Maria Del Rosario Duff was in my office today. Below is a copy of my note.    If you have questions, please do not hesitate to call me. I look forward to following Maria Del Rosario along with you.         Sincerely,        Franklyn Villalba MD        CC: No Recipients    Date of Office Visit: 24  Encounter Provider: Franklyn Villalba MD  Place of Service: [facility] CARDIOLOGY  Patient Name: Maria Del Rosario Duff  :1975    Chief Complaint   Patient presents with   • Chest Pain   :     HPI:     Ms. Duff is 49 y.o. and presents today in consultation for ER follow up at the request of Dr Feliciano.     She was previously living in [place] and in the [place] but this year, she and her  moved back.  While living in the [place] she had episodes of recurrent vomiting.  She then developed right upper abdominal pain and was seen in the emergency room.  She brought some records and they are in [place] but it appears that she had 2 high-sensitivity troponins checked and both were normal.  She says they attempted to do an echocardiogram but that the image quality was not great due to the presence of breast implants, and an outpatient cardiac MRI was recommended.  She reported that her pain would improve when she would lie on her left side, and they gave her a tentative diagnosis of pericarditis.  They did an ultrasound of her gallbladder which was reportedly normal.    Since coming home, she has not had pain but she does feel like she gets winded with exertion.  She feels palpitations intermittently where her heart feels like it skips or flutters for a short while.  She has occasional periods of dizziness where she feels like things are spinning.  She sometimes has positional lightheadedness.  She has not  passed out, thankfully.      Past Medical History:   Diagnosis Date   • GERD (gastroesophageal reflux disease)    • Heart murmur    • Irritable bowel syndrome    • Thyroid nodule        Past Surgical History:   Procedure Laterality Date   • APPENDECTOMY     • BREAST SURGERY  2010    Breast Augmentation   • TONSILLECTOMY  1996   • UPPER GASTROINTESTINAL ENDOSCOPY         Social History     Socioeconomic History   • Marital status:    Tobacco Use   • Smoking status: Never     Passive exposure: Never   • Smokeless tobacco: Never   Vaping Use   • Vaping status: Never Used   Substance and Sexual Activity   • Alcohol use: Yes     Alcohol/week: 3.0 standard drinks of alcohol     Types: 3 Glasses of wine per week   • Drug use: Never   • Sexual activity: Yes     Partners: Male     Birth control/protection: Post-menopausal, Vasectomy       Family History   Problem Relation Age of Onset   • Ulcerative colitis Mother    • Colon polyps Mother    • Breast cancer Mother    • Cancer Mother         Breast cancer   • Diabetes Mother    • Stroke Mother    • Hyperlipidemia Mother    • Colon polyps Father    • Kidney cancer Father    • Cancer Father         Kidney cancer   • Aortic aneurysm Father    • Hyperlipidemia Father    • Hypertension Father    • Diabetes Daughter         IDDM   • Cancer Maternal Grandfather         Stomach cancer   • Heart disease Paternal Grandfather         Angina   • Liver cancer Neg Hx    • Colon cancer Neg Hx    • Crohn's disease Neg Hx    • Irritable bowel syndrome Neg Hx        Review of Systems   Cardiovascular:  Positive for dyspnea on exertion and palpitations.   Neurological:  Positive for dizziness and light-headedness.       No Known Allergies      Current Outpatient Medications:   •  B Complex Vitamins (vitamin b complex) capsule capsule, Take  by mouth Daily., Disp: , Rfl:   •  Cyanocobalamin (VITAMIN B-12 PO), Take  by mouth Daily., Disp: , Rfl:   •  ESTERIFIED ESTROGENS PO, Take 1 mg by  "mouth., Disp: , Rfl:   •  Misc Natural Products (NF FORMULAS TESTOSTERONE PO), Take 5 mg by mouth., Disp: , Rfl:   •  Omega-3 Fatty Acids (OMEGA 3 PO), Take  by mouth., Disp: , Rfl:   •  Progesterone (PROMETRIUM) 200 MG capsule, Take 1 capsule by mouth Daily., Disp: , Rfl:       Objective:     Vitals:    11/22/24 0916   BP: 122/60   BP Location: Right arm   Patient Position: Sitting   Cuff Size: Adult   Pulse: 54   SpO2: 99%   Height: 172.7 cm (67.99\")     Body mass index is 20.35 kg/m².    Vitals reviewed.   Constitutional:       Appearance: Well-developed and not in distress.   Eyes:      Conjunctiva/sclera: Conjunctivae normal.   HENT:      Head: Normocephalic.      Nose: Nose normal.   Neck:      Thyroid: Thyroid normal.      Vascular: No JVD. JVD normal.      Lymphadenopathy: No cervical adenopathy.   Pulmonary:      Effort: Pulmonary effort is normal.      Breath sounds: Normal breath sounds.   Cardiovascular:      Normal rate. Regular rhythm.      Murmurs: There is no murmur.      Comments: Very subtle respirophasic 1/6 JG  Pulses:     Intact distal pulses.   Edema:     Peripheral edema absent.   Abdominal:      Palpations: Abdomen is soft.      Tenderness: There is no abdominal tenderness.   Musculoskeletal: Normal range of motion.      Cervical back: Normal range of motion. Skin:     General: Skin is warm and dry.   Neurological:      General: No focal deficit present.      Mental Status: Alert and oriented to person, place, and time.      Cranial Nerves: No cranial nerve deficit.   Psychiatric:         Behavior: Behavior normal.         Thought Content: Thought content normal.         Judgment: Judgment normal.           ECG 12 Lead    Date/Time: 11/22/2024 3:01 PM  Performed by: Franklyn Villalba MD    Authorized by: Franklyn Villalba MD  Comparison: compared with previous ECG   Similar to previous ECG  Rhythm: sinus rhythm  Conduction: conduction normal  ST Segments: ST segments normal  T Waves: T waves " normal  QRS axis: normal  Other: no other findings    Clinical impression: normal ECG            Assessment:       Diagnosis Plan   1. Chest discomfort  Adult Transthoracic Echo Complete W/ Cont if Necessary Per Protocol    Treadmill Stress Test    Holter Monitor - 24 Hour      2. Palpitations  Adult Transthoracic Echo Complete W/ Cont if Necessary Per Protocol    Treadmill Stress Test    Holter Monitor - 24 Hour      3. Shortness of breath  Adult Transthoracic Echo Complete W/ Cont if Necessary Per Protocol    Treadmill Stress Test    Holter Monitor - 24 Hour      4. Dizziness  Adult Transthoracic Echo Complete W/ Cont if Necessary Per Protocol    Treadmill Stress Test    Holter Monitor - 24 Hour      5. Lightheadedness  Adult Transthoracic Echo Complete W/ Cont if Necessary Per Protocol    Treadmill Stress Test    Holter Monitor - 24 Hour      6. Heart murmur  Adult Transthoracic Echo Complete W/ Cont if Necessary Per Protocol    Treadmill Stress Test    Holter Monitor - 24 Hour             Plan:       Mrs Duff is an extremely healthy 50yo woman who had an episode of severe and recurrent vomiting associated with RUQ pain while living in the [place] earlier this year.  They gave her diagnosis of pericarditis based on the fact that she felt better when she lied on her left side.  However, the clinical scenario does not really correlate with pericarditis.  If she were to have recurrent symptoms, I would recommend a HIDA scan as she has already had a negative gallbladder ultrasound.    She has noticed intermittent palpitations, vertiginous dizziness, positional lightheadedness, and exercise intolerance/dyspnea.  She has a very subtle heart murmur which is consistent with a benign flow murmur.  An echocardiogram was attempted in the [place] but they could not obtain good images.  I would like to try that again here before going to a cardiac MRI.  I have recommended a 24-hour heart monitor and a treadmill  stress test, as well.    She was told that her EKG was abnormal but when I look at it, it really appears unremarkable.    Sincerely,       Franklyn Villalba MD

## 2024-12-23 ENCOUNTER — HOSPITAL ENCOUNTER (OUTPATIENT)
Dept: CARDIOLOGY | Facility: HOSPITAL | Age: 49
Discharge: HOME | End: 2024-12-23
Admitting: INTERNAL MEDICINE
Payer: COMMERCIAL

## 2024-12-23 VITALS
HEART RATE: 58 BPM | SYSTOLIC BLOOD PRESSURE: 130 MMHG | OXYGEN SATURATION: 99 % | BODY MASS INDEX: 20.88 KG/M2 | HEIGHT: 67 IN | DIASTOLIC BLOOD PRESSURE: 70 MMHG | WEIGHT: 133 LBS

## 2024-12-23 DIAGNOSIS — R01.1 HEART MURMUR: ICD-10-CM

## 2024-12-23 DIAGNOSIS — R42 DIZZINESS: ICD-10-CM

## 2024-12-23 DIAGNOSIS — R00.2 PALPITATIONS: ICD-10-CM

## 2024-12-23 DIAGNOSIS — R42 LIGHTHEADEDNESS: ICD-10-CM

## 2024-12-23 DIAGNOSIS — R07.89 CHEST DISCOMFORT: ICD-10-CM

## 2024-12-23 DIAGNOSIS — R06.02 SHORTNESS OF BREATH: ICD-10-CM

## 2024-12-23 LAB
AORTIC ARCH: 2.1 CM
AORTIC DIMENSIONLESS INDEX: 0.44 (DI)
ASCENDING AORTA: 2.3 CM
AV MEAN PRESS GRAD SYS DOP V1V2: 4.1 MMHG
AV VMAX SYS DOP: 134.7 CM/SEC
BH CV ECHO MEAS - ACS: 1.56 CM
BH CV ECHO MEAS - AO MAX PG: 7.3 MMHG
BH CV ECHO MEAS - AO ROOT AREA (BSA CORRECTED): 1.4 CM2
BH CV ECHO MEAS - AO ROOT DIAM: 2.43 CM
BH CV ECHO MEAS - AO V2 VTI: 34.6 CM
BH CV ECHO MEAS - AVA(I,D): 1.21 CM2
BH CV ECHO MEAS - EDV(CUBED): 92.9 ML
BH CV ECHO MEAS - EDV(MOD-SP2): 69 ML
BH CV ECHO MEAS - EDV(MOD-SP4): 82 ML
BH CV ECHO MEAS - EF(MOD-SP2): 60.9 %
BH CV ECHO MEAS - EF(MOD-SP4): 72 %
BH CV ECHO MEAS - ESV(CUBED): 29.4 ML
BH CV ECHO MEAS - ESV(MOD-SP2): 27 ML
BH CV ECHO MEAS - ESV(MOD-SP4): 23 ML
BH CV ECHO MEAS - FS: 31.8 %
BH CV ECHO MEAS - IVS/LVPW: 1.02 CM
BH CV ECHO MEAS - IVSD: 0.9 CM
BH CV ECHO MEAS - LAT PEAK E' VEL: 13.3 CM/SEC
BH CV ECHO MEAS - LV DIASTOLIC VOL/BSA (35-75): 48.2 CM2
BH CV ECHO MEAS - LV MASS(C)D: 132.8 GRAMS
BH CV ECHO MEAS - LV MAX PG: 1.47 MMHG
BH CV ECHO MEAS - LV MEAN PG: 0.89 MMHG
BH CV ECHO MEAS - LV SYSTOLIC VOL/BSA (12-30): 13.5 CM2
BH CV ECHO MEAS - LV V1 MAX: 60.6 CM/SEC
BH CV ECHO MEAS - LV V1 VTI: 15.4 CM
BH CV ECHO MEAS - LVIDD: 4.5 CM
BH CV ECHO MEAS - LVIDS: 3.1 CM
BH CV ECHO MEAS - LVOT AREA: 2.7 CM2
BH CV ECHO MEAS - LVOT DIAM: 1.86 CM
BH CV ECHO MEAS - LVPWD: 0.89 CM
BH CV ECHO MEAS - MED PEAK E' VEL: 13.4 CM/SEC
BH CV ECHO MEAS - MV A DUR: 0.1 SEC
BH CV ECHO MEAS - MV A MAX VEL: 40.3 CM/SEC
BH CV ECHO MEAS - MV DEC SLOPE: 428.4 CM/SEC2
BH CV ECHO MEAS - MV DEC TIME: 0.2 SEC
BH CV ECHO MEAS - MV E MAX VEL: 94.6 CM/SEC
BH CV ECHO MEAS - MV E/A: 2.35
BH CV ECHO MEAS - MV MAX PG: 4.1 MMHG
BH CV ECHO MEAS - MV MEAN PG: 1.21 MMHG
BH CV ECHO MEAS - MV P1/2T: 68.3 MSEC
BH CV ECHO MEAS - MV V2 VTI: 37.9 CM
BH CV ECHO MEAS - MVA(P1/2T): 3.2 CM2
BH CV ECHO MEAS - MVA(VTI): 1.11 CM2
BH CV ECHO MEAS - PA ACC TIME: 0.14 SEC
BH CV ECHO MEAS - PA V2 MAX: 92.3 CM/SEC
BH CV ECHO MEAS - PULM A REVS DUR: 0.11 SEC
BH CV ECHO MEAS - PULM A REVS VEL: 22 CM/SEC
BH CV ECHO MEAS - PULM DIAS VEL: 30.4 CM/SEC
BH CV ECHO MEAS - PULM S/D: 1.32
BH CV ECHO MEAS - PULM SYS VEL: 40.2 CM/SEC
BH CV ECHO MEAS - QP/QS: 1.13
BH CV ECHO MEAS - RV MAX PG: 2 MMHG
BH CV ECHO MEAS - RV V1 MAX: 70.7 CM/SEC
BH CV ECHO MEAS - RV V1 VTI: 18.9 CM
BH CV ECHO MEAS - RVOT DIAM: 1.79 CM
BH CV ECHO MEAS - SUP REN AO DIAM: 1.5 CM
BH CV ECHO MEAS - SV(LVOT): 42 ML
BH CV ECHO MEAS - SV(MOD-SP2): 42 ML
BH CV ECHO MEAS - SV(MOD-SP4): 59 ML
BH CV ECHO MEAS - SV(RVOT): 47.5 ML
BH CV ECHO MEAS - SVI(LVOT): 24.7 ML/M2
BH CV ECHO MEAS - SVI(MOD-SP2): 24.7 ML/M2
BH CV ECHO MEAS - SVI(MOD-SP4): 34.7 ML/M2
BH CV ECHO MEAS - TAPSE (>1.6): 2.3 CM
BH CV ECHO MEASUREMENTS AVERAGE E/E' RATIO: 7.09
BH CV XLRA - RV BASE: 3 CM
BH CV XLRA - RV LENGTH: 7.2 CM
BH CV XLRA - RV MID: 3.1 CM
BH CV XLRA - TDI S': 12.6 CM/SEC
LEFT ATRIUM VOLUME INDEX: 17.4 ML/M2
LV EF BIPLANE MOD: 68 %
SINUS: 2.34 CM
STJ: 2.29 CM

## 2024-12-23 PROCEDURE — 25510000001 PERFLUTREN 6.52 MG/ML SUSPENSION 2 ML VIAL: Performed by: INTERNAL MEDICINE

## 2024-12-23 PROCEDURE — 93306 TTE W/DOPPLER COMPLETE: CPT

## 2024-12-23 RX ADMIN — PERFLUTREN 2 ML: 6.52 INJECTION, SUSPENSION INTRAVENOUS at 07:47

## 2024-12-23 NOTE — PROGRESS NOTES
I am covering for Dr. Villalba as she is out of office.  Please let patient know that her echocardiogram is normal.  No evidence of heart failure or any cardiac valvular abnormalities.

## 2024-12-24 ENCOUNTER — TELEPHONE (OUTPATIENT)
Dept: CARDIOLOGY | Facility: CLINIC | Age: 49
End: 2024-12-24
Payer: COMMERCIAL

## 2025-01-07 ENCOUNTER — TELEPHONE (OUTPATIENT)
Dept: CARDIOLOGY | Facility: CLINIC | Age: 50
End: 2025-01-07
Payer: COMMERCIAL

## 2025-01-08 ENCOUNTER — HOSPITAL ENCOUNTER (OUTPATIENT)
Dept: CARDIOLOGY | Facility: HOSPITAL | Age: 50
Discharge: HOME | End: 2025-01-08
Payer: COMMERCIAL

## 2025-01-08 DIAGNOSIS — R42 DIZZINESS: ICD-10-CM

## 2025-01-08 DIAGNOSIS — R07.89 CHEST DISCOMFORT: ICD-10-CM

## 2025-01-08 DIAGNOSIS — R42 LIGHTHEADEDNESS: ICD-10-CM

## 2025-01-08 DIAGNOSIS — R06.02 SHORTNESS OF BREATH: ICD-10-CM

## 2025-01-08 DIAGNOSIS — R01.1 HEART MURMUR: ICD-10-CM

## 2025-01-08 DIAGNOSIS — R00.2 PALPITATIONS: ICD-10-CM

## 2025-01-08 LAB
BH CV STRESS BP STAGE 1: NORMAL
BH CV STRESS BP STAGE 2: NORMAL
BH CV STRESS BP STAGE 3: NORMAL
BH CV STRESS BP STAGE 4: NORMAL
BH CV STRESS DURATION MIN STAGE 1: 3
BH CV STRESS DURATION MIN STAGE 2: 3
BH CV STRESS DURATION MIN STAGE 3: 3
BH CV STRESS DURATION MIN STAGE 4: 2
BH CV STRESS DURATION SEC STAGE 1: 0
BH CV STRESS DURATION SEC STAGE 2: 0
BH CV STRESS DURATION SEC STAGE 3: 0
BH CV STRESS DURATION SEC STAGE 4: 10
BH CV STRESS GRADE STAGE 1: 10
BH CV STRESS GRADE STAGE 2: 12
BH CV STRESS GRADE STAGE 3: 14
BH CV STRESS GRADE STAGE 4: 16
BH CV STRESS HR STAGE 1: 89
BH CV STRESS HR STAGE 2: 113
BH CV STRESS HR STAGE 3: 132
BH CV STRESS HR STAGE 4: 148
BH CV STRESS METS STAGE 1: 5
BH CV STRESS METS STAGE 2: 7.5
BH CV STRESS METS STAGE 3: 10
BH CV STRESS METS STAGE 4: 13
BH CV STRESS PROTOCOL 1: NORMAL
BH CV STRESS RECOVERY BP: NORMAL MMHG
BH CV STRESS RECOVERY HR: 82 BPM
BH CV STRESS SPEED STAGE 1: 1.7
BH CV STRESS SPEED STAGE 2: 2.5
BH CV STRESS SPEED STAGE 3: 3.4
BH CV STRESS SPEED STAGE 4: 4.2
BH CV STRESS STAGE 1: 1
BH CV STRESS STAGE 2: 2
BH CV STRESS STAGE 3: 3
BH CV STRESS STAGE 4: 4
MAXIMAL PREDICTED HEART RATE: 171 BPM
PERCENT MAX PREDICTED HR: 86.55 %
STRESS BASELINE BP: NORMAL MMHG
STRESS BASELINE HR: 72 BPM
STRESS PERCENT HR: 102 %
STRESS POST ESTIMATED WORKLOAD: 13 METS
STRESS POST EXERCISE DUR MIN: 11 MIN
STRESS POST EXERCISE DUR SEC: 10 SEC
STRESS POST PEAK BP: NORMAL MMHG
STRESS POST PEAK HR: 148 BPM
STRESS TARGET HR: 145 BPM

## 2025-01-08 PROCEDURE — 93017 CV STRESS TEST TRACING ONLY: CPT

## 2025-01-08 NOTE — PROGRESS NOTES
I am covering Dr. Villalba's in basket today because she is out of the office.     Please call patient.  Treadmill stress test was normal.  Please make a follow-up with Dr. Villalba in 3 months.  Thanks    Dr. Villalba-please advise if you think patient needs a cardiac MRI.

## 2025-01-09 NOTE — PROGRESS NOTES
She does not need an MRI. She had a really good quality echo that was normal, normal holter, normal TMT. It looks as though she's going to possibly see GI? I think that's the next step. She can see us PRN.    CC'd to Dr Feliciano.    Amanda Cuenca

## 2025-01-17 ENCOUNTER — LAB REQUISITION (OUTPATIENT)
Dept: LAB | Facility: HOSPITAL | Age: 50
End: 2025-01-17
Payer: COMMERCIAL

## 2025-01-17 ENCOUNTER — OUTSIDE FACILITY SERVICE (OUTPATIENT)
Dept: GASTROENTEROLOGY | Facility: CLINIC | Age: 50
End: 2025-01-17
Payer: COMMERCIAL

## 2025-01-17 DIAGNOSIS — D12.3 BENIGN NEOPLASM OF TRANSVERSE COLON: ICD-10-CM

## 2025-01-17 DIAGNOSIS — K31.7 POLYP OF STOMACH AND DUODENUM: ICD-10-CM

## 2025-01-17 DIAGNOSIS — D12.4 BENIGN NEOPLASM OF DESCENDING COLON: ICD-10-CM

## 2025-01-17 DIAGNOSIS — R10.13 EPIGASTRIC PAIN: ICD-10-CM

## 2025-01-17 PROCEDURE — 43239 EGD BIOPSY SINGLE/MULTIPLE: CPT | Performed by: INTERNAL MEDICINE

## 2025-01-17 PROCEDURE — 45380 COLONOSCOPY AND BIOPSY: CPT | Performed by: INTERNAL MEDICINE

## 2025-01-17 PROCEDURE — 88305 TISSUE EXAM BY PATHOLOGIST: CPT | Performed by: INTERNAL MEDICINE

## 2025-01-17 PROCEDURE — 45385 COLONOSCOPY W/LESION REMOVAL: CPT | Performed by: INTERNAL MEDICINE

## 2025-01-17 PROCEDURE — 43236 UPPR GI SCOPE W/SUBMUC INJ: CPT | Performed by: INTERNAL MEDICINE

## 2025-01-20 LAB
CYTO UR: NORMAL
LAB AP CASE REPORT: NORMAL
PATH REPORT.FINAL DX SPEC: NORMAL
PATH REPORT.GROSS SPEC: NORMAL